# Patient Record
(demographics unavailable — no encounter records)

---

## 2024-12-17 NOTE — PHYSICAL EXAM
[General Appearance - Well Developed] : well developed [] : no respiratory distress [Abdomen Soft] : soft [Urethral Meatus] : meatus normal [Oriented To Time, Place, And Person] : oriented to person, place, and time [de-identified] : using wheelchair

## 2024-12-17 NOTE — HISTORY OF PRESENT ILLNESS
[FreeTextEntry1] : 65 yr old male presents to follow up with kidney stones. Pt complaining of stent colic.   s/p bilateral URS on 12/9/24  stone- pending  [None] : None

## 2024-12-17 NOTE — ASSESSMENT
[FreeTextEntry1] : kidney stones Ureteral stent removed intact and in it's entirety using attached string tether.  Pt tolerated well. Post stent removal pain/colic cautions advised.  Continue Allopurinol daily and Potassium citrate bid.   dietary counseling  This includes increasing fluid intake to produce 2 to 2.5 liters of urine per day (approx 3 liters intake), and should be primarily water.  Calcium intake should be approximately 1000 mg per day.  Oxalate intake should be reduced- most common sources in diet which have very high levels include peanuts/nuts, tea, coffee, chocolate, spinach, beets, rhubarb, swiss chard. I've also given/directed to a list with other high oxalate.  Animal flesh protein should be controlled- approx 4 to 6 ounces per day, with some vegetarian days included in the week. Studies have shown that vegetarians have half the risk of stones of people who eat only 4 ounces per day of meat or fish of any kind (beef, chicken, fish, shellfish, pork, etc), indicating that a vegetarian lifestyle can decrease future stone risks.  Finally, salt intake should be reduced as high levels in the diet will increase urinary calcium. <2400 mg per day on a low sodium diet is strongly recommended.  Citrate is a benefit; zaida and limes with most citrate and least sugar- recommend 'a lemon or lime a day'; easiest with concentrate, mixed into water or other beverages.   Lifestyle changes: regular exercise and weight loss both are independent risk-reducers for stones.   plan  1) 24 hr urine before next visit  2) Renal sono at or before next visit  3) Follow up in

## 2025-02-25 NOTE — ASSESSMENT
[FreeTextEntry1] : Vol critically low, higher risks for stones calcium still elev, though btter with reduce sodium. Rec restarting HCTZ cont pot citrate cont diet mod   pt should do repeat 24 hour urine in ~ 4 to 6 weeks rpa with renal/bladder US in approx 4 months  13 min on phone, total 33 min with chart prep, film review, obtaining labs, charting

## 2025-02-25 NOTE — HISTORY OF PRESENT ILLNESS
[Other Location: e.g. School (Enter Location, City,State)___] : at [unfilled], at the time of the visit. [Other Location: e.g. Home (Enter Location, City,State)___] : at [unfilled] [Telephone (audio)] : This telephonic visit was provided via audio only technology. [Patient preference] : patient preference. [Verbal consent obtained from patient] : the patient, [unfilled] [FreeTextEntry1] : The patient-doctor relationship has been established in audio HIPAA compliant communication. The patient's identity has been confirmed. The patient was previously emailed a copy of the telemedicine consent. They have had a chance to review and has now given verbal consent and has requested care to be assessed and treated via telemedicine. They understand there may be limitations in this process, and that they may need further followup care in the office and/or hospital settings.  Verbal consent given on Feb 25 2025 11:40AM  JOSE ANGEL GUARDADO is a 65 year M who presents for f/u stone=-  Pt returns for metabolic evaluation and prevention of future stone disease following recent surgery for stone.  At issue today is review of the stone analysis, risk factors for future stone episodes and establishing whether they have pure dietary, metabolic, or mixed causes for their stone risks which is unrelated to the surgical management of their stone disease.  They underwent bilateral URS with laser 12/9/24- now doing well. Voiding comfortably. No flank or abd pain  Stent status: removed office 12/17/24  Stone analysis: 100% UA  Renal US reviewed: disagree with some of radiology read: no evidence for renal calculi at this time. echogenic foci appear linear, non-shadowing. Echogenic focus bladder right posterior could be fragments of stone. No hydro.   In presence of no voiding issues, would observe for now.  24 hour urine collection reviewed: volume: 0.88 (critically low) calcium: 349 (improved, but still elev) oxalate: 17 citrate: 474 pH: 5.1!!!! UA: 647 (excellent) sodium: 104 (MUCH better Animal protein markers: 43 sulfates (moderate, could decrease)   Now on pot citrate 10 meq bid, allopurinol 100 mg qday had been on hctz, but seems to have stopped  02/25/2025   The patient denies fevers, chills, nausea and/or vomiting, and no unexplained weight loss.  All pertinent parts of the patient PFSH (past medical, family, and social histories), laboratory, radiological studies and available physician notes were reviewed prior to starting the face-to-face portion of the telemedicine visit. Questionnaire results, where appropriate, were discussed with the patient.

## 2025-03-11 NOTE — HISTORY OF PRESENT ILLNESS
[FreeTextEntry1] : Patient presents for botox. Accompanied by son and wife. He has had a significant increase of spasticity of the lower extremities, worse gait, and difficulty swallowing.  Has not seen Neurology or had imaging in over a year.

## 2025-03-11 NOTE — ASSESSMENT
[FreeTextEntry1] : - Post-injection instructions provided. Patient informed that should take 10-14 days prior to seeing improvement. Will notify me if there is any redness or discharge noted at injection sites. Ice and/or tyelenol prn muscle soreness. 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  - Follow up in 6 weeks.

## 2025-03-11 NOTE — PROCEDURE
[Consent] : consent was given by patient or guardian [Site Verification] : the injection site was verified [Post-Injection Instructions Provided] : post-injection instructions were provided [] : EMG Guidance was used during the procedure [TWNoteComboBox1] : Biceps Femoris (Long Head) [TWNoteComboBox2] : 75 Units [de-identified] : Biceps Femoris (Long Head) [de-identified] : 75 Units [de-identified] : Medial Gastrocnemius [de-identified] : 75 Units [de-identified] : Medial Gastrocnemius [de-identified] : 75 Units [de-identified] : Soleus [de-identified] : 50 Units [de-identified] : Soleus [de-identified] : 50 Units